# Patient Record
Sex: FEMALE | Race: WHITE
[De-identification: names, ages, dates, MRNs, and addresses within clinical notes are randomized per-mention and may not be internally consistent; named-entity substitution may affect disease eponyms.]

---

## 2017-06-28 ENCOUNTER — HOSPITAL ENCOUNTER (OUTPATIENT)
Dept: HOSPITAL 80 - BMCIMAGING | Age: 72
End: 2017-06-28
Attending: INTERNAL MEDICINE
Payer: COMMERCIAL

## 2017-06-28 DIAGNOSIS — R92.8: ICD-10-CM

## 2017-06-28 DIAGNOSIS — Z12.39: Primary | ICD-10-CM

## 2017-06-28 PROCEDURE — G0206 DX MAMMO INCL CAD UNI: HCPCS

## 2017-07-26 ENCOUNTER — HOSPITAL ENCOUNTER (OUTPATIENT)
Dept: HOSPITAL 80 - BMCIMAGING | Age: 72
End: 2017-07-26
Attending: INTERNAL MEDICINE
Payer: COMMERCIAL

## 2017-07-26 DIAGNOSIS — M81.0: Primary | ICD-10-CM

## 2017-11-28 ENCOUNTER — HOSPITAL ENCOUNTER (OUTPATIENT)
Dept: HOSPITAL 80 - BMCIMAGING | Age: 72
End: 2017-11-28
Attending: INTERNAL MEDICINE
Payer: COMMERCIAL

## 2017-11-28 DIAGNOSIS — Z12.31: Primary | ICD-10-CM

## 2017-11-28 PROCEDURE — G0202 SCR MAMMO BI INCL CAD: HCPCS

## 2017-12-16 ENCOUNTER — HOSPITAL ENCOUNTER (EMERGENCY)
Dept: HOSPITAL 80 - FED | Age: 72
Discharge: HOME | End: 2017-12-16
Payer: COMMERCIAL

## 2017-12-16 VITALS — HEART RATE: 78 BPM | RESPIRATION RATE: 16 BRPM | SYSTOLIC BLOOD PRESSURE: 114 MMHG | DIASTOLIC BLOOD PRESSURE: 65 MMHG

## 2017-12-16 VITALS — OXYGEN SATURATION: 93 % | TEMPERATURE: 98.8 F

## 2017-12-16 DIAGNOSIS — J01.90: ICD-10-CM

## 2017-12-16 DIAGNOSIS — J18.9: Primary | ICD-10-CM

## 2017-12-16 DIAGNOSIS — E86.9: ICD-10-CM

## 2017-12-16 LAB
% IMMATURE GRANULYOCYTES: 0.3 % (ref 0–1.1)
ABSOLUTE IMMATURE GRANULOCYTES: 0.02 10^3/UL (ref 0–0.1)
ABSOLUTE NRBC COUNT: 0 10^3/UL (ref 0–0.01)
ADD DIFF?: NO
ADD MORPH?: NO
ADD SCAN?: NO
ANION GAP SERPL CALC-SCNC: 10 MEQ/L (ref 8–16)
ATYPICAL LYMPHOCYTE FLAG: 0 (ref 0–99)
CALCIUM SERPL-MCNC: 9.5 MG/DL (ref 8.5–10.4)
CHLORIDE SERPL-SCNC: 100 MEQ/L (ref 97–110)
CO2 SERPL-SCNC: 27 MEQ/L (ref 22–31)
CREAT SERPL-MCNC: 0.5 MG/DL (ref 0.6–1)
ERYTHROCYTE [DISTWIDTH] IN BLOOD BY AUTOMATED COUNT: 12.9 % (ref 11.5–15.2)
FRAGMENT RBC FLAG: 0 (ref 0–99)
GFR SERPL CREATININE-BSD FRML MDRD: > 60 ML/MIN/{1.73_M2}
GLUCOSE SERPL-MCNC: 131 MG/DL (ref 70–100)
HCT VFR BLD CALC: 43.3 % (ref 38–47)
HGB BLD-MCNC: 14.9 G/DL (ref 12.6–16.3)
LEFT SHIFT FLG: 0 (ref 0–99)
LIPEMIA HEMOLYSIS FLAG: 90 (ref 0–99)
MCH RBC BLDCO QN: 32.4 PG (ref 27.9–34.1)
MCHC RBC AUTO-ENTMCNC: 34.4 G/DL (ref 32.4–36.7)
MCV RBC AUTO: 94.1 FL (ref 81.5–99.8)
NRBC-AUTO%: 0 % (ref 0–0.2)
PLATELET # BLD: 195 10^3/UL (ref 150–400)
PLATELET CLUMPS FLAG: 10 (ref 0–99)
PMV BLD AUTO: 10.3 FL (ref 8.7–11.7)
POTASSIUM SERPL-SCNC: 4.1 MEQ/L (ref 3.5–5.2)
RBC # BLD AUTO: 4.6 10^6/UL (ref 4.18–5.33)
SODIUM SERPL-SCNC: 137 MEQ/L (ref 134–144)

## 2017-12-16 PROCEDURE — 96361 HYDRATE IV INFUSION ADD-ON: CPT

## 2017-12-16 PROCEDURE — 96374 THER/PROPH/DIAG INJ IV PUSH: CPT

## 2017-12-16 PROCEDURE — 99285 EMERGENCY DEPT VISIT HI MDM: CPT

## 2017-12-16 PROCEDURE — 70450 CT HEAD/BRAIN W/O DYE: CPT

## 2017-12-16 PROCEDURE — 96375 TX/PRO/DX INJ NEW DRUG ADDON: CPT

## 2017-12-16 NOTE — EDPHY
H & P


Stated Complaint: HA FOR 4 DAYS, COLD


Time Seen by Provider: 12/16/17 15:13


HPI/ROS: 





CHIEF COMPLAINT:  Headache, pneumonia





HISTORY OF PRESENT ILLNESS:  Patient is a 72-year-old female who had mild cough 

throughout the week that is gradually progressed to being productive today.  No 

fever.  She also had dental surgery done on Tuesday which she states has been 

pain free and doing well.  She however began having a headache on Wednesday 

that has been persistent and unrelenting.  It hurts worse when she coughs.  She 

does not have any sinus pressure or hearing changes.  No vision changes.  No 

fever.  No neck pain or stiffness. No nausea vomiting. No chest pain. Today she 

was seen at the urgent care [facility] and diagnosed clinically 

with pneumonia and started on doxycycline.  She was also referred here from the 

[facility] [place] for CT of her head.  She has been taking ibuprofen with some 

relief .  She states that she did take some Vicodin that was prescribed after 

dental surgery at worked well for her headache. No focal weakness numbness or 

paresthesias.





REVIEW OF SYSTEMS:


Constitutional:  denies: chills, fever, recent illness, recent injury


EENTM: denies: blurred vision, double vision, nose congestion


Respiratory: denies: cough, shortness of breath


Cardiac: denies: chest pain, irregular heart rate, lightheadedness, palpitations


Gastrointestinal/Abdominal: denies: abdominal pain, diarrhea, nausea, vomiting, 

blood streaked stools


Genitourinary: denies: dysuria, frequency, hematuria, pain


Musculoskeletal: denies: joint pain, muscle pain


Skin: denies: lesions, rash, jaundice, bruising


Neurological:  See HPI


Hematologic/Lymphatic: denies: blood clots, easy bleeding, easy bruising


Immunologic/allergic: denies: HIV/AIDS, transplant








EXAM:


GENERAL:  Well-appearing, well-nourished and in no acute distress.


HEAD:  Atraumatic, normocephalic.


EYES:  Pupils equal round and reactive to light, extraocular movements intact, 

sclera anicteric, conjunctiva are normal.


ENT:  TMs normal, nares patent, oropharynx clear without exudates.  Moist 

mucous membranes.


NECK:  Normal range of motion, supple without lymphadenopathy or JVD.


LUNGS:  Breath sounds clear to auscultation bilaterally and equal.  No wheezes 

rales or rhonchi.


HEART:  Regular rate and rhythm without murmurs, rubs or gallops.


ABDOMEN:  Soft, nontender, normoactive bowel sounds.  No guarding, no rebound.  

No masses appreciated.


BACK:  No CVA tenderness, no spinal tenderness, step-offs or deformities


EXTREMITIES:  Normal range of motion, no pitting or edema.  No clubbing or 

cyanosis.


NEUROLOGICAL:  Cranial nerves II through XII grossly intact.  Normal speech, 

normal gait.  5/5 strength, normal movement in all extremities, normal sensation


PSYCH:  Normal mood, normal affect.


SKIN:  Warm, dry, normal turgor, no visible rashes or lesions.








Source: Patient


Exam Limitations: No limitations





- Personal History


Current Tetanus/Diphtheria Vaccine: Unsure


Current Tetanus Diphtheria and Acellular Pertussis (TDAP): Unsure





- Medical/Surgical History


Hx Asthma: No


Hx Chronic Respiratory Disease: No


Hx Diabetes: No


Hx Cardiac Disease: No


Hx Renal Disease: No


Hx Cirrhosis: No


Hx Alcoholism: No


Hx HIV/AIDS: No


Hx Splenectomy or Spleen Trauma: No


Other PMH: M-GRAVIS, BURNING MOUTH SYNDROME





- Family History


Significant Family History: No pertinent family hx





- Social History


Smoking Status: Never smoked


Alcohol Use: Sober


Drug Use: None


Constitutional: 


 Initial Vital Signs











Temperature (C)  37.1 C   12/16/17 14:07


 


Heart Rate  92   12/16/17 14:07


 


Respiratory Rate  18   12/16/17 14:07


 


Blood Pressure  129/71 H  12/16/17 14:07


 


O2 Sat (%)  93   12/16/17 14:07








 











O2 Delivery Mode               Room Air














Allergies/Adverse Reactions: 


 





No Known Allergies Allergy (Unverified 12/16/17 14:10)


 








Home Medications: 














 Medication  Instructions  Recorded


 


AMOXICILLIN  12/16/17


 


Metoclopramide [Reglan 10 mg tab 10 mg PO BID PRN #10 tab 12/16/17





(RX)]  


 


Nortriptyline HCl  12/16/17














Medical Decision Making





- Diagnostics


Imaging: Discussed imaging studies w/ On call Radiologist


ED Course/Re-evaluation: 





4:15 p.m. we discussed the CT results.  The patient is reassured.  Her pain is 

much better after Reglan.  I will prescribe her this to take as needed for the 

next day or 2 while the antibiotics work.  We also discussed decongestants.  

She is currently taking doxycycline which she just started today.  We discussed 

indications for returning.


Differential Diagnosis: 





Partial list of the Differential diagnosis considered include but were not 

limited to;  migraine, tension headache, sinusitis, and although unlikely based 

on the history and physical exam, I also considered dissection, hemorrhage, 

meningitis.  I discussed these differential diagnoses and the plan with the 

patient as well as the usual and expected course.  The patient understands that 

the diagnosis is provisional and that in medicine we are not always correct and 

that further workup is often warranted.  Usual and customary warnings were 

given.  All of the patient's questions were answered.  The patient was 

instructed to return to the emergency department should the symptoms at all 

worsen or return, otherwise to followup with the physician as we discussed.





- Data Points


Laboratory Results: 


 Laboratory Results





 12/16/17 15:30 





 12/16/17 15:30 








Medications Given: 


 








Discontinued Medications





Hydromorphone HCl (Dilaudid)  1 mg IVP EDNOW ONE


   Stop: 12/16/17 15:23


   Last Admin: 12/16/17 15:52 Dose:  1 mg


Sodium Chloride (Ns)  1,000 mls @ 0 mls/hr IV ONCE ONE; Wide Open


   PRN Reason: Protocol


   Stop: 12/16/17 15:23


   Last Admin: 12/16/17 15:52 Dose:  1,000 mls


Metoclopramide HCl (Reglan Injection)  10 mg IVP EDNOW ONE


   Stop: 12/16/17 15:23


   Last Admin: 12/16/17 15:52 Dose:  10 mg








Departure





- Departure


Disposition: Home, Routine, Self-Care


Clinical Impression: 


Sinusitis


Qualifiers:


 Sinusitis location: unspecified location Chronicity: acute Recurrence: not 

specified as recurrent Qualified Code(s): J01.90 - Acute sinusitis, unspecified





Pneumonia


Qualifiers:


 Pneumonia type: due to unspecified organism Laterality: unspecified laterality 

Lung location: unspecified part of lung Qualified Code(s): J18.9 - Pneumonia, 

unspecified organism





Condition: Fair


Instructions:  Sinusitis (ED), Pneumonia (ED)


Referrals: 


Sonali Zepeda MD [Primary Care Provider] - As per Instructions


Prescriptions: 


Metoclopramide [Reglan 10 mg tab (RX)] 10 mg PO BID PRN #10 tab


 PRN Reason: Headache

## 2018-11-05 ENCOUNTER — HOSPITAL ENCOUNTER (OUTPATIENT)
Dept: HOSPITAL 80 - BMCIMAGING | Age: 73
End: 2018-11-05
Attending: INTERNAL MEDICINE
Payer: COMMERCIAL

## 2018-11-05 DIAGNOSIS — Z78.0: ICD-10-CM

## 2018-11-05 DIAGNOSIS — M81.0: ICD-10-CM

## 2018-11-05 DIAGNOSIS — Z86.39: ICD-10-CM

## 2018-11-05 DIAGNOSIS — Z13.820: Primary | ICD-10-CM

## 2019-01-16 ENCOUNTER — HOSPITAL ENCOUNTER (OUTPATIENT)
Dept: HOSPITAL 80 - BMCIMAGING | Age: 74
End: 2019-01-16
Attending: INTERNAL MEDICINE
Payer: COMMERCIAL

## 2019-01-16 DIAGNOSIS — Z12.31: Primary | ICD-10-CM

## 2019-02-04 ENCOUNTER — HOSPITAL ENCOUNTER (OUTPATIENT)
Dept: HOSPITAL 80 - BMCIMAGING | Age: 74
End: 2019-02-04
Attending: INTERNAL MEDICINE
Payer: COMMERCIAL

## 2019-02-04 DIAGNOSIS — M54.5: Primary | ICD-10-CM
